# Patient Record
Sex: FEMALE | Race: BLACK OR AFRICAN AMERICAN | NOT HISPANIC OR LATINO | Employment: FULL TIME | URBAN - METROPOLITAN AREA
[De-identification: names, ages, dates, MRNs, and addresses within clinical notes are randomized per-mention and may not be internally consistent; named-entity substitution may affect disease eponyms.]

---

## 2021-04-23 ENCOUNTER — OCCUPATIONAL HEALTH (OUTPATIENT)
Dept: URGENT CARE | Facility: CLINIC | Age: 36
End: 2021-04-23

## 2021-04-23 DIAGNOSIS — Z02.83 ENCOUNTER FOR DRUG SCREENING: Primary | ICD-10-CM

## 2021-04-23 LAB
CTP QC/QA: YES
POC 10 PANEL DRUG SCREEN: NEGATIVE

## 2021-04-23 PROCEDURE — 80305 DRUG TEST PRSMV DIR OPT OBS: CPT | Mod: S$GLB,,, | Performed by: FAMILY MEDICINE

## 2021-04-23 PROCEDURE — 80305 POCT RAPID DRUG SCREEN 10 PANEL: ICD-10-PCS | Mod: S$GLB,,, | Performed by: FAMILY MEDICINE

## 2024-04-30 ENCOUNTER — HOSPITAL ENCOUNTER (OUTPATIENT)
Dept: RADIOLOGY | Facility: HOSPITAL | Age: 39
Discharge: HOME | End: 2024-04-30
Attending: PODIATRIST
Payer: COMMERCIAL

## 2024-04-30 ENCOUNTER — OFFICE VISIT (OUTPATIENT)
Dept: PODIATRY | Facility: CLINIC | Age: 39
End: 2024-04-30
Payer: COMMERCIAL

## 2024-04-30 VITALS — HEIGHT: 66 IN | BODY MASS INDEX: 30.54 KG/M2 | WEIGHT: 190.06 LBS

## 2024-04-30 DIAGNOSIS — M25.471 ANKLE EDEMA, BILATERAL: ICD-10-CM

## 2024-04-30 DIAGNOSIS — M79.89 PALPABLE MASS OF SOFT TISSUE OF ANKLE: ICD-10-CM

## 2024-04-30 DIAGNOSIS — M25.471 ANKLE EDEMA, BILATERAL: Primary | ICD-10-CM

## 2024-04-30 DIAGNOSIS — M25.472 ANKLE EDEMA, BILATERAL: Primary | ICD-10-CM

## 2024-04-30 DIAGNOSIS — M25.472 ANKLE EDEMA, BILATERAL: ICD-10-CM

## 2024-04-30 PROCEDURE — 99204 OFFICE O/P NEW MOD 45 MIN: CPT | Mod: S$GLB,,, | Performed by: PODIATRIST

## 2024-04-30 PROCEDURE — 3008F BODY MASS INDEX DOCD: CPT | Mod: CPTII,S$GLB,, | Performed by: PODIATRIST

## 2024-04-30 PROCEDURE — 73630 X-RAY EXAM OF FOOT: CPT | Mod: TC,50,PO

## 2024-04-30 PROCEDURE — 73630 X-RAY EXAM OF FOOT: CPT | Mod: 26,RT,ICN, | Performed by: RADIOLOGY

## 2024-04-30 PROCEDURE — 1159F MED LIST DOCD IN RCRD: CPT | Mod: CPTII,S$GLB,, | Performed by: PODIATRIST

## 2024-04-30 PROCEDURE — 1160F RVW MEDS BY RX/DR IN RCRD: CPT | Mod: CPTII,S$GLB,, | Performed by: PODIATRIST

## 2024-04-30 PROCEDURE — 73630 X-RAY EXAM OF FOOT: CPT | Mod: 26,LT,ICN, | Performed by: RADIOLOGY

## 2024-04-30 PROCEDURE — 99999 PR PBB SHADOW E&M-NEW PATIENT-LVL III: CPT | Mod: PBBFAC,,, | Performed by: PODIATRIST

## 2024-04-30 RX ORDER — FUROSEMIDE 20 MG/1
TABLET ORAL
COMMUNITY
Start: 2023-08-08

## 2024-04-30 NOTE — PROGRESS NOTES
Subjective:     Patient ID: Andres Wick is a 39 y.o. female.    Chief Complaint: Ankle Pain (Pt c/o BL ankle swelling/ pain 7/10, R>L, non-diabetic pt wears fab, PCP Josue Cedeno MD last seen 23)    Andres is a 39 y.o. female who presents to the podiatry clinic  with complaint of  bilateral ankle pain. Onset of the symptoms was several months ago. Precipitating event: none known. Current symptoms include: ability to bear weight, but with some pain and swelling. Aggravating factors: walking. Symptoms have been intermittent. Evaluation to date:  PCP . Treatment to date:  prescribed fluid pills but does not take them reguarly . Patients rates pain 7/10 on pain scale.    There is no problem list on file for this patient.      Medication List with Changes/Refills   Current Medications    ACETAMINOPHEN (TYLENOL) 500 MG TABLET    Take 2 tablets (1,000 mg total) by mouth 3 (three) times daily as needed for Pain.    FUROSEMIDE (LASIX) 20 MG TABLET    Take 1 po q am prn swelling.    KETOROLAC (TORADOL) 10 MG TABLET    Take 1 tablet (10 mg total) by mouth every 6 (six) hours as needed for Pain.    NAPROXEN (NAPROSYN) 500 MG TABLET    Take 1 tablet (500 mg total) by mouth 2 (two) times daily with meals.       Review of patient's allergies indicates:  No Known Allergies    Past Surgical History:   Procedure Laterality Date    APPENDECTOMY       SECTION      HERNIA REPAIR      TONSILLECTOMY      TUBAL LIGATION         No family history on file.    Social History     Socioeconomic History    Marital status:    Tobacco Use    Smoking status: Never   Substance and Sexual Activity    Alcohol use: No    Drug use: No     Social Determinants of Health     Food Insecurity: No Food Insecurity (2/3/2021)    Received from Health system    Hunger Vital Sign     Worried About Running Out of Food in the Last Year: Never true     Ran Out of Food in the Last Year: Never true   Transportation Needs: No  "Transportation Needs (2/3/2021)    Received from [facility]    PRAPARE - Transportation     Lack of Transportation (Medical): No     Lack of Transportation (Non-Medical): No       Vitals:    04/30/24 0926   Weight: 86.2 kg (190 lb 0.6 oz)   Height: 5' 5.5" (1.664 m)   PainSc:   7       Hemoglobin A1C   Date Value Ref Range Status   08/14/2023 5.4 4.8 - 5.6 % Final     Comment:              Prediabetes: 5.7 - 6.4           Diabetes: >6.4           Glycemic control for adults with diabetes: <7.0   01/30/2023 6.1 (H) 4.8 - 5.6 % Final     Comment:              Prediabetes: 5.7 - 6.4           Diabetes: >6.4           Glycemic control for adults with diabetes: <7.0       Review of Systems   Constitutional:  Negative for chills and fever.   Respiratory:  Negative for shortness of breath.    Cardiovascular:  Negative for chest pain, palpitations, orthopnea, claudication and leg swelling.   Gastrointestinal:  Negative for diarrhea, nausea and vomiting.   Musculoskeletal:  Negative for joint pain.   Skin:  Negative for rash.   Neurological:  Negative for dizziness, tingling, sensory change, focal weakness and weakness.   Psychiatric/Behavioral: Negative.           Objective:       PHYSICAL EXAM: Apperance: Alert and orient in no distress,well developed, and with good attention to grooming and body habits  Patient presents ambulating in sandals  Lower Extremity Physical Exam:  VASCULAR: Dorsalis pedis pulses 2/4 bilateral and Posterior Tibial pulses 2/4 bilateral. Mild edema observed bilateral. Varicosities absent bilateral.   DERMATOLOGICAL: No skin rashes, subcutaneous nodules, lesions, or ulcers observed bilateral.  NEUROLOGICAL: Light touch, sharp-dull, proprioception all present and equal bilaterally.    MUSCULOSKELETAL: Muscle strength is 5/5 for foot inverters, everters, plantarflexors, and dorsiflexors. Muscle tone is normal. (+) pain on palpation of bilateral lateral/posterior ankle. Palpable soft " tissue mass noted to bilateral lateral ankles.         Assessment:       ICD-10-CM ICD-9-CM   1. Ankle edema, bilateral  M25.471 719.07    M25.472    2. Palpable mass of soft tissue of ankle  M79.89 729.99       Plan:   Ankle edema, bilateral  -     X-Ray Foot Complete Bilateral; Future; Expected date: 04/30/2024    Palpable mass of soft tissue of ankle  -     US Soft Tissue, Lower Extremity, Right; Future; Expected date: 04/30/2024  -     US Soft Tissue, Lower Extremity, Left; Future; Expected date: 04/30/2024      I counseled the patient on her conditions, regarding findings of my examination, my impressions, and usual treatment plan.   Patient counseled on ways to improve swelling in lower extremities including decreasing/eliminating salt intake, elevating lower extremities, compression stocking, or oral medications. Patient states she understands.   Ordered bilateral ankle x-rays to be completed today.   Ordered bilateral lower extremities venous ultrasounds.   Discussed with patient th e need to follow up with PCP about fluid medication.   Patient to return if symptoms persist or worsen.             Lex Hedrick, MARCELINO  [facility]